# Patient Record
Sex: FEMALE | URBAN - METROPOLITAN AREA
[De-identification: names, ages, dates, MRNs, and addresses within clinical notes are randomized per-mention and may not be internally consistent; named-entity substitution may affect disease eponyms.]

---

## 2018-03-29 ENCOUNTER — EMERGENCY (EMERGENCY)
Facility: HOSPITAL | Age: 48
LOS: 0 days | Discharge: HOME | End: 2018-03-29
Attending: EMERGENCY MEDICINE

## 2018-03-29 VITALS
HEIGHT: 60 IN | RESPIRATION RATE: 18 BRPM | DIASTOLIC BLOOD PRESSURE: 92 MMHG | HEART RATE: 75 BPM | OXYGEN SATURATION: 97 % | SYSTOLIC BLOOD PRESSURE: 139 MMHG | WEIGHT: 108.03 LBS | TEMPERATURE: 99 F

## 2018-03-29 DIAGNOSIS — J01.91 ACUTE RECURRENT SINUSITIS, UNSPECIFIED: ICD-10-CM

## 2018-03-29 DIAGNOSIS — J32.9 CHRONIC SINUSITIS, UNSPECIFIED: ICD-10-CM

## 2018-03-29 DIAGNOSIS — Z87.891 PERSONAL HISTORY OF NICOTINE DEPENDENCE: ICD-10-CM

## 2018-03-29 NOTE — ED PROVIDER NOTE - PHYSICAL EXAMINATION
CONSTITUTIONAL: Well-developed; well-nourished; in no acute distress.   SKIN: warm, dry  HEAD: Normocephalic; atraumatic.  EYES: PERRL, EOMI, no conjunctival erythema, no preseptal cellultis,   ENT: No nasal discharge; airway clear. + edema to the bilateral maxillary area around nasal bridge. + bilateral facial tenderness over frontal and maxillary sinus.   NECK: Supple; non tender.  CARD: S1, S2 normal; no murmurs, gallops, or rubs. Regular rate and rhythm.   RESP: No wheezes, rales or rhonchi.  ABD: soft ntnd  EXT: Normal ROM.    LYMPH:+ bilateral submandibular lymphadenopthy, soft, mobile   NEURO: Alert, oriented, grossly unremarkable

## 2018-03-29 NOTE — ED PROVIDER NOTE - OBJECTIVE STATEMENT
46 yo F no pmh presents with a sinus infection. States that sinus symptoms started last thursday, had yellow/green congestion with some blood. Went to her pmd on monday and was given cefprozil which she started on tuesday. States that her congestion has improved but this morning noted some swelling around her nasal bridge. No headache, no fevers, no redness on the face. States that she may have misread the antibiotic dose and was only taking the cefprozil once daily instead of twice daily. pmd is Dr. Longoria.

## 2018-03-29 NOTE — ED PROVIDER NOTE - NS ED ROS FT
Eyes:  No visual changes, eye pain or discharge.  ENMT:  + sinus infection diagnosis 3 days ago improved with cefprozil but now presents with facial swelling.   MS:  No  joint pain or back pain.  Neuro:  No headache   Skin:  + nasal bridge swelling, no redness on the face  Endocrine: No hx of Dm

## 2018-03-29 NOTE — ED ADULT TRIAGE NOTE - CHIEF COMPLAINT QUOTE
Pt states"my doctor sent me here because I have sinus infection and the medication isn't working and he thinks it spread.

## 2018-03-29 NOTE — ED PROVIDER NOTE - ATTENDING CONTRIBUTION TO CARE
I personally evaluated the patient. I reviewed the Resident’s or Physician Assistant’s note (as assigned above), and agree with the findings and plan except as documented in my note.  pt with minimal erythema/edema over right cheek, EOMI no diplopia, dentition and oropharynx unremarkable, to take medication as prescribed, f/u with pmd. Patient counseled regarding conditions which should prompt return.

## 2021-05-25 PROBLEM — Z00.00 ENCOUNTER FOR PREVENTIVE HEALTH EXAMINATION: Status: ACTIVE | Noted: 2021-05-25

## 2021-09-11 ENCOUNTER — TRANSCRIPTION ENCOUNTER (OUTPATIENT)
Age: 51
End: 2021-09-11

## 2021-09-18 ENCOUNTER — TRANSCRIPTION ENCOUNTER (OUTPATIENT)
Age: 51
End: 2021-09-18

## 2024-06-08 NOTE — ED ADULT NURSE NOTE - CHIEF COMPLAINT QUOTE
Pt states"my doctor sent me here because I have sinus infection and the medication isn't working and he thinks it spread.
-3